# Patient Record
Sex: FEMALE | Race: OTHER
[De-identification: names, ages, dates, MRNs, and addresses within clinical notes are randomized per-mention and may not be internally consistent; named-entity substitution may affect disease eponyms.]

---

## 2017-01-13 ENCOUNTER — HOSPITAL ENCOUNTER (EMERGENCY)
Dept: HOSPITAL 72 - EMR | Age: 32
Discharge: HOME | End: 2017-01-13
Payer: MEDICAID

## 2017-01-13 VITALS — DIASTOLIC BLOOD PRESSURE: 62 MMHG | SYSTOLIC BLOOD PRESSURE: 118 MMHG

## 2017-01-13 VITALS — BODY MASS INDEX: 28.56 KG/M2 | HEIGHT: 67 IN | WEIGHT: 182 LBS

## 2017-01-13 VITALS — DIASTOLIC BLOOD PRESSURE: 71 MMHG | SYSTOLIC BLOOD PRESSURE: 109 MMHG
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
## 2017-01-13 DIAGNOSIS — S30.0XXA: Primary | ICD-10-CM

## 2017-01-13 DIAGNOSIS — M62.838: ICD-10-CM

## 2017-01-13 DIAGNOSIS — J18.9: ICD-10-CM

## 2017-01-13 PROCEDURE — 96372 THER/PROPH/DIAG INJ SC/IM: CPT

## 2017-01-13 PROCEDURE — 99283 EMERGENCY DEPT VISIT LOW MDM: CPT

## 2017-01-13 NOTE — EMERGENCY ROOM REPORT
History of Present Illness


General


Chief Complaint:  General Complaint


Source:  Patient





Present Illness


HPI


 32-year-old female presents emergency department complaining of low back pain 

x1 week 9/10 in severity localized primarily in the paraspinal with occasional 

radiation into the legs describes pain as "tightness in the low back".  She 

fell off of a hover board and landed on her low back. denies hitting her head 

or loss of consciousness.  Patient denies saddle anesthesia.  Patient also 

reports productive cough x2 days with intermittent fevers and exposure to close 

contact (grandmother) who is now hospitalized for pneumonia. Pt. states 

symptoms persist despite conservative treatment at home with OTC medications  

Denies recent travel.  Denies numbness tingling or loss of sensation or gross 

motor movements of the extremities, incontinence of bowel or bladder. Denies CP

, Palpitations, LOC, AMS, dizziness, Changes in Vision, Sensation, paresthesias

, or a sudden severe headache.


Allergies:  


Coded Allergies:  


     No Known Allergies (Unverified , 1/13/17)





Patient History


Past Medical History:  see triage record


Past Surgical History:  none


Pertinent Family History:  none


Last Menstrual Period:  12/25/16


Pregnant Now:  No


Immunizations:  UTD


Reviewed Nursing Documentation:  PMH: Agreed, PSxH: Agreed





Nursing Documentation-PMH


Past Medical History:  No Stated History





Review of Systems


All Other Systems:  negative except mentioned in HPI





Physical Exam





Vital Signs








  Date Time  Temp Pulse Resp B/P Pulse Ox O2 Delivery O2 Flow Rate FiO2


 


1/13/17 19:02 101.7 95 16 113/73 100 Room Air  








Sp02 EP Interpretation:  reviewed, normal


General Appearance:  no apparent distress, alert, GCS 15, non-toxic


Head:  normocephalic, atraumatic


Eyes:  bilateral eye PERRL, bilateral eye normal inspection


ENT:  hearing grossly normal, normal pharynx, no angioedema, normal voice


Neck:  full range of motion, supple/symm/no masses


Respiratory:  chest non-tender, lungs clear, normal breath sounds, no rhonchi, 

no respiratory distress, no retraction, no accessory muscle use, no wheezing, 

speaking full sentences, other - non-productive wet cough observed multiple 

times durring PE and ED visit.


Cardiovascular #1:  regular rate, rhythm, no edema


Gastrointestinal:  normal bowel sounds, non tender, soft, no guarding, no 

rebound


Rectal:  deferred


Genitourinary:  normal inspection, no CVA tenderness


Musculoskeletal:  back normal, gait/station normal, normal range of motion, no 

calf tenderness, tender - TTP across the lumbar area of the back, no midline 

spinous process pain, no tailbone TTP, significant paraspinal TTP, no bruising 

noted no erythema, no obvious deformity pt. has FROM with mild pain on full 

flexion.  Pt is NVI no saddle anesthesia.


Neurologic:  alert, oriented x3, responsive, motor strength/tone normal, 

sensory intact, speech normal


Psychiatric:  judgement/insight normal, memory normal, mood/affect normal, no 

suicidal/homicidal ideation


Skin:  normal color, no rash, warm/dry, well hydrated


Lymphatic:  no adenopathy





Medical Decision Making


PA Attestation


Dr. butler is my supervising Physician whom patient management has been 

discussed with.


Diagnostic Impression:  


 Primary Impression:  


 Contusion


 Qualified Codes:  S30.0XXA - Contusion of lower back and pelvis, initial 

encounter


 Additional Impressions:  


 Muscle spasm


 Atypical pneumonia


ER Course


Pt. presents to the ED c/o  productive cough, fevers and exposure to close 

contact who is now hospitalized for pneumonia. pt. states symptoms persist 

despite conservative treatment at home with OTC medications. pt. also has LBP s/

p mechanical fall off hover board.  negative LOC. 





Ddx considered but are not limited to URI, pneumonia, PE, strep pharyngitis, 

meningitis, contusion, fracture, muscle strain/spasm. 





Vital signs: Pt. is afebrile, the remaining VS are WNL


H&PE are most consistent with  atypical pneumonia, and contusion.


- Pt is oxygenating well and appears stable for outpatient treatment.  





ORDERS: none required at this time, the diagnosis is clinical





ED INTERVENTIONS: None required at this time. 








DISCHARGE: At this time pt. is stable for d/c to home. Will provide printed 

patient care instructions, and any necessary prescriptions. Care plan and 

follow up instructions have been discussed with the patient prior to discharge.





Last Vital Signs








  Date Time  Temp Pulse Resp B/P Pulse Ox O2 Delivery O2 Flow Rate FiO2


 


1/13/17 19:09 101.0 86 18 118/62 100 Room Air  








Disposition:  HOME, SELF-CARE


Condition:  Stable


Scripts


Ibuprofen* (MOTRIN*) 600 Mg Tablet


600 MG ORAL THREE TIMES A DAY, #30 TAB 0 Refills


   Prov: Svitlana Dickinson         1/13/17 


D-Methorphan Hb/Prometh Hcl* (PROMETHAZINE-DM SYRUP*) 118 Ml Syrup


5 ML ORAL Q6H Y for For Cough, #118 ML 0 Refills


   Prov: Svitlana Dickinson         1/13/17 


Cyclobenzaprine Hcl* (FLEXERIL*) 10 Mg Tablet


10 MG ORAL THREE TIMES A DAY, #20 TAB


   Prov: Svitlana Dickinson         1/13/17 


Azithromycin* (ZITHROMAX*) 250 Mg Tablet


250 MG ORAL DAILY, #6 TAB 0 Refills


   Take two tables once daily for 1 day, then one tablet once daily for 4


   days.


   Prov: Svitlana Dickinson         1/13/17


Patient Instructions:  Contusion, Muscle Cramps and Spasms, Easy-to-Read, Upper 

Respiratory Infection, Adult, Easy-to-Read





Additional Instructions:  


Take medications as directed. 


Follow up with PCP in 3-5 days 


Return sooner to ED if new symptoms occur, or current symptoms become worse. 


Do not drink alcohol, drive, or operate heavy machinery while taking Muscle 

relaxer as this may cause drowsiness.











Svitlana Dickinson Jan 13, 2017 19:34

## 2018-06-03 ENCOUNTER — HOSPITAL ENCOUNTER (EMERGENCY)
Dept: HOSPITAL 72 - EMR | Age: 33
Discharge: HOME | End: 2018-06-03
Payer: MEDICAID

## 2018-06-03 VITALS — SYSTOLIC BLOOD PRESSURE: 108 MMHG | DIASTOLIC BLOOD PRESSURE: 58 MMHG

## 2018-06-03 VITALS — BODY MASS INDEX: 28.88 KG/M2 | WEIGHT: 184 LBS | HEIGHT: 67 IN

## 2018-06-03 DIAGNOSIS — S71.152A: Primary | ICD-10-CM

## 2018-06-03 DIAGNOSIS — Z23: ICD-10-CM
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
## 2018-06-03 PROCEDURE — 99284 EMERGENCY DEPT VISIT MOD MDM: CPT

## 2018-06-03 PROCEDURE — 96372 THER/PROPH/DIAG INJ SC/IM: CPT

## 2018-06-03 PROCEDURE — 90715 TDAP VACCINE 7 YRS/> IM: CPT

## 2018-06-03 PROCEDURE — 90471 IMMUNIZATION ADMIN: CPT

## 2018-06-03 NOTE — EMERGENCY ROOM REPORT
History of Present Illness


General


Chief Complaint:  Animal Bite


Source:  Patient





Present Illness


HPI


Patient presents with complaints of dog bite to the left upper thigh


This happened earlier prior to arrival


Patient reports that it's neighbors dog


Apparently there was some scuffle


The patient tried to get into the middle and was accidentally bitten from the 

reports of the patient





Denies any abdominal pain denies any vomiting


Pain to the area is 3 out of 10


Allergies:  


Coded Allergies:  


     No Known Allergies (Unverified , 1/13/17)





Patient History


Past Medical History:  see triage record


Pertinent Family History:  none


Last Menstrual Period:  06/02/18


Pregnant Now:  No


Reviewed Nursing Documentation:  PMH: Agreed; PSxH: Agreed





Nursing Documentation-PMH


Past Medical History:  No Stated History





Review of Systems


All Other Systems:  negative except mentioned in HPI





Physical Exam





Vital Signs








  Date Time  Temp Pulse Resp B/P (MAP) Pulse Ox O2 Delivery O2 Flow Rate FiO2


 


6/3/18 21:36 98.5 74 16 108/58 97 Room Air  





 98.4       








Sp02 EP Interpretation:  reviewed, normal


General Appearance:  well appearing, no apparent distress


Head:  normocephalic, atraumatic


Eyes:  bilateral eye PERRL, bilateral eye EOMI


ENT:  hearing grossly normal, normal pharynx, TMs + canals normal, uvula midline


Neck:  full range of motion, supple, no meningismus, no bony tend


Respiratory:  lungs clear, normal breath sounds, no rhonchi, no respiratory 

distress, no retraction, no accessory muscle use


Cardiovascular #1:  normal peripheral pulses, regular rate, rhythm, no edema, 

no gallop, no JVD, no murmur


Gastrointestinal:  normal bowel sounds, non tender, soft, no mass, no 

organomegaly, non-distended, no guarding, no hernia, no pulsatile mass, no 

rebound


Genitourinary:  no CVA tenderness


Musculoskeletal:  normal inspection


Neurologic:  oriented x3, responsive, CNs III-XII nml as tested, motor strength/

tone normal, sensory intact


Psychiatric:  mood/affect normal


Skin:  other - There is an area on the left upper mid leg region on the lateral 

aspect appears that there was a break in the skin, there is another small 

abrasion next to that, and there is a associated ecchymosis approximately 4 x 2 

cm no expanding hematoma


Lymphatic:  normal inspection, no adenopathy





Medical Decision Making


Diagnostic Impression:  


 Primary Impression:  


 Bite by animal


ER Course


Patient was given a tetanus shot as her last tetanus was just over 5 years ago





Also provided with initial antibiotic here





There appears to be break in the skin with associated hematoma


Patient is appropriate for close outpatient follow-up


There is lack of any concern for rabies


Given the description of the dog





Last Vital Signs








  Date Time  Temp Pulse Resp B/P (MAP) Pulse Ox O2 Delivery O2 Flow Rate FiO2


 


6/3/18 21:36 98.5 74 16 108/58 97 Room Air  





 98.4       








Status:  improved


Disposition:  HOME, SELF-CARE


Condition:  Improved


Scripts


Ibuprofen* (MOTRIN*) 600 Mg Tablet


600 MG ORAL Q8H PRN for For Pain, #20 TAB 0 Refills


   Prov: Nuvia Ward DO         6/3/18 


Amoxicillin/Potassium Clav 875-125* (AUGMENTIN 875-125 TABLET*) 1 Each Tablet


1 TAB ORAL TWICE A DAY, #14 TAB


   Prov: Nuvia Ward DO         6/3/18





Additional Instructions:  


Patient is provided with the discharge instructions notified to follow up with 

primary doctor in the next 2-3 days otherwise return to the er with any 

worsening symptoms.


Please note that this report is being documented using DRAGON technology.  This 

can lead to erroneous entry secondary to incorrect interpretation by the 

dictating instrument.











Nuvia Ward DO Chidi 3, 2018 21:55